# Patient Record
Sex: FEMALE | Race: BLACK OR AFRICAN AMERICAN | URBAN - METROPOLITAN AREA
[De-identification: names, ages, dates, MRNs, and addresses within clinical notes are randomized per-mention and may not be internally consistent; named-entity substitution may affect disease eponyms.]

---

## 2023-09-18 PROBLEM — K21.9 GERD (GASTROESOPHAGEAL REFLUX DISEASE): Status: ACTIVE | Noted: 2023-09-18

## 2023-09-18 PROBLEM — R07.89 ATYPICAL CHEST PAIN: Status: ACTIVE | Noted: 2023-09-18

## 2023-09-18 PROBLEM — E78.5 DYSLIPIDEMIA: Status: ACTIVE | Noted: 2023-09-18

## 2023-09-18 PROBLEM — I10 HTN (HYPERTENSION): Status: ACTIVE | Noted: 2023-09-18

## 2023-09-18 PROBLEM — I10 CHRONIC HYPERTENSION: Status: ACTIVE | Noted: 2023-09-18

## 2023-09-18 RX ORDER — HYDROCHLOROTHIAZIDE 12.5 MG/1
2 TABLET ORAL DAILY
COMMUNITY
Start: 2020-12-16

## 2023-09-18 RX ORDER — METOPROLOL TARTRATE 50 MG/1
1 TABLET ORAL 2 TIMES DAILY
COMMUNITY
Start: 2020-12-16

## 2023-09-18 RX ORDER — ATORVASTATIN CALCIUM 20 MG/1
10 TABLET, FILM COATED ORAL DAILY
COMMUNITY
Start: 2020-12-16

## 2023-10-18 ENCOUNTER — APPOINTMENT (OUTPATIENT)
Dept: OBSTETRICS AND GYNECOLOGY | Facility: CLINIC | Age: 58
End: 2023-10-18
Payer: COMMERCIAL

## 2024-08-26 ENCOUNTER — APPOINTMENT (OUTPATIENT)
Dept: OBSTETRICS AND GYNECOLOGY | Facility: CLINIC | Age: 59
End: 2024-08-26
Payer: COMMERCIAL

## 2024-08-26 VITALS
SYSTOLIC BLOOD PRESSURE: 135 MMHG | HEIGHT: 64 IN | DIASTOLIC BLOOD PRESSURE: 70 MMHG | WEIGHT: 192.1 LBS | BODY MASS INDEX: 32.8 KG/M2

## 2024-08-26 DIAGNOSIS — R10.2 PELVIC PAIN: Primary | ICD-10-CM

## 2024-08-26 DIAGNOSIS — Z12.4 SCREENING FOR CERVICAL CANCER: ICD-10-CM

## 2024-08-26 DIAGNOSIS — Z12.31 VISIT FOR SCREENING MAMMOGRAM: ICD-10-CM

## 2024-08-26 PROCEDURE — 3078F DIAST BP <80 MM HG: CPT | Performed by: OBSTETRICS & GYNECOLOGY

## 2024-08-26 PROCEDURE — 88175 CYTOPATH C/V AUTO FLUID REDO: CPT

## 2024-08-26 PROCEDURE — 99204 OFFICE O/P NEW MOD 45 MIN: CPT | Performed by: OBSTETRICS & GYNECOLOGY

## 2024-08-26 PROCEDURE — 87624 HPV HI-RISK TYP POOLED RSLT: CPT

## 2024-08-26 PROCEDURE — 1036F TOBACCO NON-USER: CPT | Performed by: OBSTETRICS & GYNECOLOGY

## 2024-08-26 PROCEDURE — 3008F BODY MASS INDEX DOCD: CPT | Performed by: OBSTETRICS & GYNECOLOGY

## 2024-08-26 PROCEDURE — 3075F SYST BP GE 130 - 139MM HG: CPT | Performed by: OBSTETRICS & GYNECOLOGY

## 2024-08-26 RX ORDER — AMLODIPINE BESYLATE 10 MG/1
TABLET ORAL
COMMUNITY
Start: 2024-07-02

## 2024-08-26 NOTE — PATIENT INSTRUCTIONS
Thanks for coming in today for follow up.    Arrange to have an ultrasound performed to help evaluate your right pelvic pain.    Return the office next few weeks to review your ultrasound results and come up with a plan of care.    A Pap smear was sent.  Results should be available in the next few weeks.    Arrange to have a mammogram performed once a year.    Return to the office in the next few weeks for your annual GYN exam.    Follow-up with your PCP and other healthcare specialist as needed.

## 2024-08-26 NOTE — PROGRESS NOTES
58-year-old -0-3-2 -American woman presents today for evaluation of abdominal/pelvic pain that occurred approxi a month ago.  It was associated with an episode of emesis that she describes as being bilious.  She states the pain was sharp and lasted for several hours.  She denied any vaginal bleeding or abnormal discharge.  She is without bladder symptoms.  She reports normal bowel movements.    GynHx: Menarche began at age 13.  She has been postmenopausal since age 53.  She denies any STIs, PID, abnormal Pap smears or sexual abuse.  She is not currently sexually active as her  has ED.    OBHx:  x2. SAB x3.     Subjective   Patient ID: Sara Rojas is a 58 y.o. female who presents for New Patient Visit (Pap 2009/Анна none on file/Colonoscopy none on file  /Pain in lower ABD area//Ping Mckinney MA /).  HPI    Review of Systems    Objective   Physical Exam  HENT:      Head: Normocephalic.      Right Ear: External ear normal.      Left Ear: External ear normal.      Nose: Nose normal.      Mouth/Throat:      Mouth: Mucous membranes are moist.   Eyes:      Extraocular Movements: Extraocular movements intact.      Pupils: Pupils are equal, round, and reactive to light.   Abdominal:      Palpations: Abdomen is soft.   Genitourinary:     General: Normal vulva.      Labia:         Right: Lesion present. No rash.         Left: No rash or lesion.       Vagina: Normal.      Cervix: Normal. No lesion.      Uterus: Normal.       Adnexa: Right adnexa normal and left adnexa normal.      Comments: Vagina atrophic with a cystocele.   ?2 cm right fibroid  Musculoskeletal:         General: Normal range of motion.   Skin:     General: Skin is warm and dry.   Neurological:      General: No focal deficit present.      Mental Status: She is alert and oriented to person, place, and time.   Psychiatric:         Mood and Affect: Mood normal.         Behavior: Behavior normal.       A/P: Pelvic pain     -   US     -  RTC for follow up     Health Maintenance     -  Pap    -  Mammogram

## 2024-09-05 ENCOUNTER — HOSPITAL ENCOUNTER (OUTPATIENT)
Dept: RADIOLOGY | Facility: HOSPITAL | Age: 59
Discharge: HOME | End: 2024-09-05
Payer: COMMERCIAL

## 2024-09-05 ENCOUNTER — HOSPITAL ENCOUNTER (OUTPATIENT)
Dept: RADIOLOGY | Facility: CLINIC | Age: 59
Discharge: HOME | End: 2024-09-05
Payer: COMMERCIAL

## 2024-09-05 VITALS — BODY MASS INDEX: 32.44 KG/M2 | WEIGHT: 190 LBS | HEIGHT: 64 IN

## 2024-09-05 DIAGNOSIS — Z12.31 VISIT FOR SCREENING MAMMOGRAM: ICD-10-CM

## 2024-09-05 DIAGNOSIS — R10.2 PELVIC PAIN: ICD-10-CM

## 2024-09-05 PROCEDURE — 77067 SCR MAMMO BI INCL CAD: CPT

## 2024-09-05 PROCEDURE — 76856 US EXAM PELVIC COMPLETE: CPT

## 2024-09-06 ENCOUNTER — TELEPHONE (OUTPATIENT)
Dept: OBSTETRICS AND GYNECOLOGY | Facility: CLINIC | Age: 59
End: 2024-09-06
Payer: COMMERCIAL

## 2024-09-06 NOTE — TELEPHONE ENCOUNTER
Left message for pt to return call:  Fibroid uterus with a normal endometrial stripe. May use Tylenol or Motrin for discomfort.        Galina Harvey MD  [facility] [address] Clinical Support Staff  Fibroid uterus with a normal endometrial stripe. May use Tylenol or Motrin for discomfort.

## 2024-09-09 ENCOUNTER — TELEPHONE (OUTPATIENT)
Dept: OBSTETRICS AND GYNECOLOGY | Facility: CLINIC | Age: 59
End: 2024-09-09
Payer: COMMERCIAL

## 2024-09-09 NOTE — TELEPHONE ENCOUNTER
----- Message from Galina Harvey sent at 9/6/2024  2:06 PM EDT -----  Fibroid uterus with a normal endometrial stripe. May use Tylenol or Motrin for discomfort.

## 2024-09-09 NOTE — TELEPHONE ENCOUNTER
Contacted pt and verified name and .  Pt notified of her ultrasound results and and suggestion from Dr. Harvey to Take Tylenol or Motrin for pain. Also discussed pap results with pt at well and let her know that it resulted normal.  Patient states understanding and has no questions at this time.

## 2024-09-10 ENCOUNTER — HOSPITAL ENCOUNTER (OUTPATIENT)
Dept: RADIOLOGY | Facility: EXTERNAL LOCATION | Age: 59
Discharge: HOME | End: 2024-09-10

## 2024-09-29 ENCOUNTER — OFFICE VISIT (OUTPATIENT)
Dept: URGENT CARE | Age: 59
End: 2024-09-29
Payer: COMMERCIAL

## 2024-09-29 VITALS
SYSTOLIC BLOOD PRESSURE: 136 MMHG | BODY MASS INDEX: 32.44 KG/M2 | DIASTOLIC BLOOD PRESSURE: 67 MMHG | HEART RATE: 88 BPM | OXYGEN SATURATION: 98 % | TEMPERATURE: 98.3 F | HEIGHT: 64 IN | WEIGHT: 190 LBS | RESPIRATION RATE: 16 BRPM

## 2024-09-29 DIAGNOSIS — T22.211A BURN OF SECOND DEGREE OF RIGHT FOREARM, INITIAL ENCOUNTER: Primary | ICD-10-CM

## 2024-09-29 PROCEDURE — 3075F SYST BP GE 130 - 139MM HG: CPT | Performed by: FAMILY MEDICINE

## 2024-09-29 PROCEDURE — 3078F DIAST BP <80 MM HG: CPT | Performed by: FAMILY MEDICINE

## 2024-09-29 PROCEDURE — 99203 OFFICE O/P NEW LOW 30 MIN: CPT | Performed by: FAMILY MEDICINE

## 2024-09-29 PROCEDURE — 3008F BODY MASS INDEX DOCD: CPT | Performed by: FAMILY MEDICINE

## 2024-09-29 RX ORDER — CEPHALEXIN 500 MG/1
500 CAPSULE ORAL 3 TIMES DAILY
Qty: 30 CAPSULE | Refills: 0 | Status: SHIPPED | OUTPATIENT
Start: 2024-09-29 | End: 2024-10-09

## 2024-09-29 RX ORDER — ASPIRIN 81 MG/1
81 TABLET ORAL DAILY
COMMUNITY

## 2024-09-29 ASSESSMENT — PAIN SCALES - GENERAL: PAINLEVEL: 4

## 2024-09-29 NOTE — PROGRESS NOTES
"Subjective   Patient ID: Sara Rojas is a 58 y.o. female. They present today with a chief complaint of Burn (Grease burn distal right forearm x 3 days. Pt applying topical antibiotic. Large blister and red around the edges, pt questions infection).    History of Present Illness  HPI  Patient presents 3 days after burning her right forearm with grease at home.  She states that she has been keeping it clean but has noted some redness around the edges.  She is concerned about the risk of infection.  She denies fevers or chills.  She states that there has been minimal swelling.  She denies any loss of movement in her hand wrist or elbow.  No loss of sensation.  She is otherwise feeling well  Past Medical History  Allergies as of 09/29/2024    (No Known Allergies)       (Not in a hospital admission)       Past Medical History:   Diagnosis Date    High cholesterol     Hypertension        Past Surgical History:   Procedure Laterality Date    CERVICAL SPINE SURGERY      DILATION AND CURETTAGE OF UTERUS      KNEE SURGERY          reports that she has never smoked. She has never used smokeless tobacco. She reports current alcohol use. She reports that she does not use drugs.    Review of Systems  Review of Systems as in history of present illness                               Objective    Vitals:    09/29/24 1540   BP: 136/67   BP Location: Right arm   Patient Position: Sitting   BP Cuff Size: Large adult   Pulse: 88   Resp: 16   Temp: 36.8 °C (98.3 °F)   TempSrc: Oral   SpO2: 98%   Weight: 86.2 kg (190 lb)   Height: 1.626 m (5' 4\")     No LMP recorded. (Menstrual status: Menopausal).    Physical Exam  General: Vitals noted, no distress. Afebrile.   Vascular: Extremity warm and dry with good peripheral pulses noted  Extremities: No peripheral edema.  Normal  strength in right hand and normal range of motion in flexion extension of wrist.  Skin: No rash. No bruising or discoloration.  Irregularly shaped nontender " blistering noted over anterior distal right forearm with slight ring of erythema.  No signs of pustules.  Skin exam otherwise unremarkable  Neuro: No focal neurologic deficits, NIH score of 0.    Procedures    Point of Care Test & Imaging Results from this visit  No results found for this visit on 09/29/24.   No results found.    Diagnostic study results (if any) were reviewed by Spenser Roque MD.    Assessment/Plan   Allergies, medications, history, and pertinent labs/EKGs/Imaging reviewed by Spenser Roque MD.     Medical Decision Making  At time of discharge patient was clinically well-appearing and HDS for outpatient management. The patient and/or family was educated regarding diagnosis, supportive care, OTC and Rx medications. The patient and/or family was given the opportunity to ask questions prior to discharge.  They verbalized understanding of my discussion of the plans for treatment, expected course, indications to return to  or seek further evaluation in ED, and the need for timely follow up as directed.   They were provided with a work/school excuse if requested.    Orders and Diagnoses  Diagnoses and all orders for this visit:  Burn of second degree of right forearm, initial encounter      Medical Admin Record      Patient disposition: Home    Electronically signed by Spenser Roque MD  3:50 PM